# Patient Record
(demographics unavailable — no encounter records)

---

## 2024-10-17 NOTE — PHYSICAL EXAM
[FreeTextEntry1] : Head:  Normocephalic Neck: Supple.  Mental Status:  Alert Oriented X3 Speech normal and no aphasia or dysarthria.  Cranial Nerves:  PERRL, Visual Fields full  EOMI no diplopia no ptosis no nystagmus, V through XII intact.  Motor: Unchanged bilateral postural and intention hand tremor left greater than right upper extremities and the tremor is not present at rest.  Strength and tone are normal and there is no dysmetria.  DTRs: Symmetric and 2+.  Sensory: Unremarkable.  Gait:  Normal.

## 2024-10-17 NOTE — HISTORY OF PRESENT ILLNESS
[FreeTextEntry1] : URBAN [de-identified] : 80 yo female with history of osteoporosis on actonel, hypothyroidism, atrial fibrillation, hypertension, hand tremor presents for AWV.  Started prolia with endocrine. Has a skin growth in her belly button, had derm look at it but they couldn't get it out. Not painful.   Specialists: GYN dermatology ([facility]) cardiology (Dr. Sharma) endocrinology.  neurology   Mammogram- 06/11/2024 Colonoscopy- cologuard 05/16/2023 Bone density due: 10/2024

## 2024-10-17 NOTE — PHYSICAL EXAM
[Coordination Grossly Intact] : coordination grossly intact [No Focal Deficits] : no focal deficits [Normal] : affect was normal and insight and judgment were intact [de-identified] : approx 1cm round black nodule in umbilicus

## 2024-10-17 NOTE — HEALTH RISK ASSESSMENT
[No] : No [1 or 2 (0 pts)] : 1 or 2 (0 points) [Never (0 pts)] : Never (0 points) [One fall no injury in past year] : Patient reported one fall in the past year without injury [0] : 2) Feeling down, depressed, or hopeless: Not at all (0) [PHQ-2 Negative - No further assessment needed] : PHQ-2 Negative - No further assessment needed [None] : None [Alone] : lives alone [] :  [Feels Safe at Home] : Feels safe at home [Fully functional (bathing, dressing, toileting, transferring, walking, feeding)] : Fully functional (bathing, dressing, toileting, transferring, walking, feeding) [Fully functional (using the telephone, shopping, preparing meals, housekeeping, doing laundry, using] : Fully functional and needs no help or supervision to perform IADLs (using the telephone, shopping, preparing meals, housekeeping, doing laundry, using transportation, managing medications and managing finances) [Designated Healthcare Proxy] : Designated healthcare proxy [Name: ___] : Health Care Proxy's Name: [unfilled]  [Relationship: ___] : Relationship: [unfilled] [Audit-CScore] : 0 [de-identified] : As above  [[id number]] : 0 [Reports changes in hearing] : Reports no changes in hearing [Reports changes in vision] : Reports no changes in vision [Reports changes in dental health] : Reports no changes in dental health [MammogramDate] : 05/08/2023 [BoneDensityDate] : 10/2022 [ColonoscopyDate] : 05/04/2023 [de-identified] : Had cataract surgery, will need follow up procedure  [de-identified] : Up to date with dentist  [AdvancecareDate] : 07/10/2023

## 2024-10-17 NOTE — ASSESSMENT
[FreeTextEntry1] : Impression: This 81-year-old female patient has a presentation consistent with essential tremor.  There is been partial response to propranolol.  She cannot take primidone due to the interaction with Xarelto.  She has a history of atrial fibrillation and bradycardia which is asymptomatic but limits the dose of the propranolol.  Recommendations: Continue propranolol 60 mg extended release once daily.  Monitor heart rate in the setting of borderline bradycardia.  Office follow-up in 6 months.

## 2024-10-17 NOTE — HISTORY OF PRESENT ILLNESS
[FreeTextEntry1] : This patient is seen for an office visit with respect to her essential tremor.  Her condition is unchanged while taking propranolol extra strength 60 mg once daily.  She is not having side effects.  She continues to describe the tremor with activity though she can perform all of her ADL.  She has bradycardia.  Medications are unchanged and she receives Xarelto for atrial fibrillation and follows with cardiology.

## 2024-10-17 NOTE — REVIEW OF SYSTEMS
[Palpitations] : palpitations [Negative] : Psychiatric [de-identified] : hand tremor  [FreeTextEntry3] : history of cataracts sp removal  [de-identified] : lesion in belly button

## 2024-10-17 NOTE — PHYSICAL EXAM
[Coordination Grossly Intact] : coordination grossly intact [No Focal Deficits] : no focal deficits [Normal] : affect was normal and insight and judgment were intact [de-identified] : approx 1cm round black nodule in umbilicus

## 2024-10-17 NOTE — HISTORY OF PRESENT ILLNESS
[FreeTextEntry1] : URBAN [de-identified] : 82 yo female with history of osteoporosis on actonel, hypothyroidism, atrial fibrillation, hypertension, hand tremor presents for AWV.  Started prolia with endocrine. Has a skin growth in her belly button, had derm look at it but they couldn't get it out. Not painful.   Specialists: GYN dermatology ([facility]) cardiology (Dr. Sharma) endocrinology.  neurology   Mammogram- 06/11/2024 Colonoscopy- cologuard 05/16/2023 Bone density due: 10/2024

## 2024-10-17 NOTE — REVIEW OF SYSTEMS
[Palpitations] : palpitations [Negative] : Psychiatric [de-identified] : hand tremor  [FreeTextEntry3] : history of cataracts sp removal  [de-identified] : lesion in belly button

## 2024-10-17 NOTE — HEALTH RISK ASSESSMENT
[No] : No [1 or 2 (0 pts)] : 1 or 2 (0 points) [Never (0 pts)] : Never (0 points) [One fall no injury in past year] : Patient reported one fall in the past year without injury [0] : 2) Feeling down, depressed, or hopeless: Not at all (0) [PHQ-2 Negative - No further assessment needed] : PHQ-2 Negative - No further assessment needed [None] : None [Alone] : lives alone [] :  [Feels Safe at Home] : Feels safe at home [Fully functional (bathing, dressing, toileting, transferring, walking, feeding)] : Fully functional (bathing, dressing, toileting, transferring, walking, feeding) [Fully functional (using the telephone, shopping, preparing meals, housekeeping, doing laundry, using] : Fully functional and needs no help or supervision to perform IADLs (using the telephone, shopping, preparing meals, housekeeping, doing laundry, using transportation, managing medications and managing finances) [Designated Healthcare Proxy] : Designated healthcare proxy [Name: ___] : Health Care Proxy's Name: [unfilled]  [Relationship: ___] : Relationship: [unfilled] [Audit-CScore] : 0 [de-identified] : As above  [[id number]] : 0 [Reports changes in hearing] : Reports no changes in hearing [Reports changes in vision] : Reports no changes in vision [Reports changes in dental health] : Reports no changes in dental health [MammogramDate] : 05/08/2023 [BoneDensityDate] : 10/2022 [ColonoscopyDate] : 05/04/2023 [de-identified] : Had cataract surgery, will need follow up procedure  [de-identified] : Up to date with dentist  [AdvancecareDate] : 07/10/2023

## 2024-11-03 NOTE — PHYSICAL EXAM
[No Rash or Lesion] : No rash or lesion [Alert] : alert [Oriented to Place] : oriented to place [Oriented to Person] : oriented to person [Oriented to Time] : oriented to time [Calm] : calm [de-identified] : No acute distress [de-identified] : Nonlabored breathing [de-identified] : soft, non-tender, non-distended, omphalolith noted which was able to be excised at bedside without issues. no open wound or abscess

## 2024-11-03 NOTE — HISTORY OF PRESENT ILLNESS
[de-identified] : This is a very pleasant 81F who recently noted a growth in her belly button. It hasn't changed in size. It doesn't cause her pain. No open wound or drainage in the area. No similar growths.

## 2024-11-03 NOTE — PROCEDURE
[FreeTextEntry1] : Risks and benefits of bedside removal of omphalolith were discussed and the patient was agreeable to proceed. I cleansed the area and I gently milked the stone out without issues. No open wound or drainage. Size of stone was ~2 cm x -0.75 cm.

## 2024-11-03 NOTE — ASSESSMENT
[FreeTextEntry1] : This is a very pleasant 81F who is here with an omphalolith which was excised at bedside without issue.  I counseled to let the water run in the area and wash with soap/water daily.  Return as needed.

## 2024-11-03 NOTE — PHYSICAL EXAM
[No Rash or Lesion] : No rash or lesion [Alert] : alert [Oriented to Place] : oriented to place [Oriented to Person] : oriented to person [Oriented to Time] : oriented to time [Calm] : calm [de-identified] : No acute distress [de-identified] : Nonlabored breathing [de-identified] : soft, non-tender, non-distended, omphalolith noted which was able to be excised at bedside without issues. no open wound or abscess

## 2024-11-03 NOTE — HISTORY OF PRESENT ILLNESS
[de-identified] : This is a very pleasant 81F who recently noted a growth in her belly button. It hasn't changed in size. It doesn't cause her pain. No open wound or drainage in the area. No similar growths.

## 2025-04-14 NOTE — ASSESSMENT
[FreeTextEntry1] : Target in patient's 65 years and older: TSH 4 to 6 but I will also accept a TSH in the normal range.  Last TSH was at goal.  Comparison of the October 2023 and July 2024 thyroid US revealed the patient's thyroid nodules remained stable in size or decreased in size. No indication for FNA - next thyroid US due in August 2025.   Patient was diagnosed with OSTEOPOROSIS in THE 1990s.  The patient was on risendronate for 20 years so I discontinued this on 05/10/2024. Based on DXA scan done in October 2023, FRAX score is 13% for major osteoporotic fracture and 3.9% for hip fracture. I discussed prolia use with the patient on 07/22/2024 and agreed to use this. I prescribed prolia on 07/22/2024 and first dose was administered on 10/10/2024. NEXT DXA scan due in November 2025.  NEXT PROLIA INJECTION DUE TODAY SO NURSE IS TO ADMINISTER THIS  Plan: 1. Prolia 60 mg sc x 1 dose ordered - this is to be administered TODAY (see Nurse note for further details) 2. Thyroid US to be done in August 2025 3. Labs to be done in August 2025 - see below 4. Follow up in August 2025 to review results

## 2025-04-14 NOTE — PHYSICAL EXAM
[de-identified] : General: No distress, well nourished Eyes: Normal Sclera, EOMI, PERRL ENT: Normal appearance of the nose, normal oropharynx Neck/Thyroid: No cervical lymphadenopathy, thyroid gland 20 g in size, no thyroid nodules, non-tender Respiratory: No use of accessory muscles of respiration, vesicular breath sounds heard bilaterally, no crepitations or ronchi Cardiovascular: S1 and S2 heard and normal, no S3 or S4, no murmurs, radial pulse normal bilaterally Abdomen: soft, non-tender, no masses, normal bowel sounds Musculoskeletal: No swelling or deformities of joints of hands, no pedal edema Neurological: Normal range of motion in the hands, Normal brachioradialis reflexes bilaterally Psychiatry: Patient converses normally, good judgement and insight Skin: No rashes in hands, no nodules palpated in hands

## 2025-04-17 NOTE — HISTORY OF PRESENT ILLNESS
[FreeTextEntry1] : This patient is seen for an office visit with respect to her essential tremor.  She is unchanged while taking propranolol 60 mg once daily and not having any side effects.  She has a tremor of both hands.  She did see an occupational therapist and her condition is stable.  She especially has difficulty with writing and using utensils.  She has a history of bradycardia which has limited the dose of propranolol.  She receives Xarelto for atrial fibrillation and follows with cardiology.  There is no other clinical change.